# Patient Record
Sex: MALE | Race: WHITE | NOT HISPANIC OR LATINO | Employment: OTHER | URBAN - METROPOLITAN AREA
[De-identification: names, ages, dates, MRNs, and addresses within clinical notes are randomized per-mention and may not be internally consistent; named-entity substitution may affect disease eponyms.]

---

## 2023-07-24 ENCOUNTER — HOSPITAL ENCOUNTER (OUTPATIENT)
Dept: DATA CONVERSION | Facility: HOSPITAL | Age: 48
End: 2023-07-24
Attending: PHYSICAL MEDICINE & REHABILITATION | Admitting: PHYSICAL MEDICINE & REHABILITATION
Payer: COMMERCIAL

## 2023-07-24 DIAGNOSIS — M54.16 RADICULOPATHY, LUMBAR REGION: ICD-10-CM

## 2023-07-24 DIAGNOSIS — M51.26 OTHER INTERVERTEBRAL DISC DISPLACEMENT, LUMBAR REGION: ICD-10-CM

## 2023-09-29 VITALS — BODY MASS INDEX: 58.99 KG/M2 | HEIGHT: 60 IN | WEIGHT: 300.49 LBS

## 2023-09-30 NOTE — H&P
History & Physical Reviewed:   I have reviewed the History and Physical dated:  24-Jul-2023   History and Physical reviewed and relevant findings noted. Patient examined to review pertinent physical  findings.: No significant changes   Home Medications Reviewed: no changes noted   Allergies Reviewed: no changes noted       ERAS (Enhanced Recovery After Surgery):  ·  ERAS Patient: no     Consent:   COVID-19 Consent:  ·  COVID-19 Risk Consent Surgeon has reviewed key risks related to the risk of yulissa COVID-19 and if they contract COVID-19 what the risks are.       Electronic Signatures:  Selvin Goncalves)  (Signed 24-Jul-2023 07:33)   Authored: History & Physical Reviewed, ERAS, Consent,  Note Completion      Last Updated: 24-Jul-2023 07:33 by Selvin Goncalves)

## 2024-01-23 ENCOUNTER — APPOINTMENT (OUTPATIENT)
Dept: PAIN MEDICINE | Facility: CLINIC | Age: 49
End: 2024-01-23
Payer: COMMERCIAL

## 2024-02-13 ENCOUNTER — OFFICE VISIT (OUTPATIENT)
Dept: PAIN MEDICINE | Facility: CLINIC | Age: 49
End: 2024-02-13
Payer: COMMERCIAL

## 2024-02-13 DIAGNOSIS — M51.26 HERNIATED NUCLEUS PULPOSUS, L2-3: ICD-10-CM

## 2024-02-13 DIAGNOSIS — M51.26 HERNIATED NUCLEUS PULPOSUS, L3-4: Primary | ICD-10-CM

## 2024-02-13 DIAGNOSIS — M54.16 BILATERAL LUMBAR RADICULOPATHY: ICD-10-CM

## 2024-02-13 PROCEDURE — 99214 OFFICE O/P EST MOD 30 MIN: CPT | Performed by: PHYSICAL MEDICINE & REHABILITATION

## 2024-02-13 NOTE — PROGRESS NOTES
Chief complaint  Back and lower limbs     History  Mr Miner is back for visit  WILMA in July lasted until now  Pain in the back and lower limbs  The pain in the back is deep achy worse in the mid back area.  This is associated with tight muscle bands.  This limits the range of motion of the lumbar spine mainly in forward flexion.  The pain in the back is radiating around the side on the lateral aspect of the   thighs going down toward the lateral aspect of the legs into the lateral malleosli toward the lateral aspect of the feet towards the big toes.    This pain down to the lower limbs is more of a burning tingling sensation.  The pain in the   lower limbs worsens with bending forward or with any lifting, it improves with laying on the side and resting.  This is similar to the associated pain in the middle to lower back.  Denied any bowel or bladder incontinence.  With the worst pain there is tendency to catch the toe especially on carpeted area.  This occurs mostly when tired and toward the end of the day.     Usually the Wilma control pain and allow him to function            The pain control  are helping control the pain and improving Activities of Daily living and quality of life and quality of sleep.       Denied any fever or chills. No weight loss and no night sweats. No cough or sputum production. No diarrhea   The constipation has been responding to fibers and over the counter medications.     No bladder and bowel incontinence and no other changes in bladder and bowel. No skin changes.  Reports tiredness and fatigability only if the pain is not controlled.   Denied opioids diversion and abuse and denies alcoholism. Denies overuse of the pain medications.     Physical examination  Awake, alert and oriented for time place and persons   declined Chaperone for the visit and was adequately  draped for the exam.  There is decreased sensory to light touch on the lateral aspects of the thighs and around the   knee  going down to the lateral aspect of the legs to the   lateral malleoli into the dorsum of the feet..    Deep tendon reflexes is present for the patellar tendons bilaterally.  Achilles reflexes are present bilaterally and symmetric.    Medial Hamstrings reflex is decreased bilaterally  Plantar cutaneous are downgoing.  Ankle dorsiflexion is 5/5 bilaterally.  Plantar flexion of the ankles are 5/5 bilaterally.  Big toe extension is 4/5 bilaterally  Negative Tinel's sign over the right peroneal nerve at the fibular neck.  Herbert sign for axial loading and global rotation are negative.  No aberrant pain behavior.     Diagnosis  Problem List Items Addressed This Visit       Herniated nucleus pulposus, L2-3 - Primary    Relevant Orders    Transforaminal    Bilateral lumbar radiculopathy    Relevant Orders    Transforaminal        Plan  Reviewed the pain generators.  Went over the types of pain with neuropathic and nociceptive and different pathologies and therapeutic modalities. Discussed the mechanism of action of interventions from acupuncture, physical therapy , regular exercises, injections, botox, spinal cord stimulation, and role of surgery     Went over pathology of the intervertebral disc displacement and the anatomical relation to the Nerve roots and relation to the radicular symptoms. Went over treatment modalities with conservative treatment including acupuncture   and epidural steroid injection with fluoroscopy guidance and last resort of surgery      Continue with HEP  Consider RAY bilateral L5 Transforaminal epidural steroid injection      Risks not limited to infection, sepsis, abscess, bleeding , nerve injury, paralysis, and death...   Manuel PA     Discussed about NSAIDS and I explained about the opioids sparing effect to allow keeping the opioids dose at minimal effective dose.   I went over the potential side effects of the NSAIDS on the gastrointestinal, renal and cardiovascular systems.      I  detailed the side effects from the acetaminophen in the medication and made aware of those. I also explained about the cumulative effects on the organs and mainly the liver.        Follow-up after above or earlier if needed     The level of clinical decision making in this office visit,  is high, given the high risks of complications with the morbidity and mortality due to the fact that acute and chronic pain may pose a threat to life and bodily function, if under treated, poorly treated, or with failure to maintain adequate treatment and timely medical follow up. Additionally over treatment has its own set of complications including overdosing on the pain medications and also the habit forming potentials with the use of the medications used to treat chronic painful conditions including therapeutic classes classified as dangerous medications. Given the serious and fluctuating nature of pain level and instensity with extensive consideration for whenever pain changes, there is always the risk of prolonged functional impairment requiring close patient monitoring with regular assessments and reassessments and high level medical decision making at every office visit. The amount and complexity of data reviewed is high given the patient clinical presentation, labs,  data, radiology reports, and other tests as discussed during office visits. Pertinent data whether positive or negative were taken in consideration in the process of making this high level medical decision.

## 2024-03-18 ENCOUNTER — HOSPITAL ENCOUNTER (OUTPATIENT)
Dept: RADIOLOGY | Facility: CLINIC | Age: 49
Discharge: HOME | End: 2024-03-18
Payer: COMMERCIAL

## 2024-03-18 ENCOUNTER — HOSPITAL ENCOUNTER (OUTPATIENT)
Dept: OPERATING ROOM | Facility: CLINIC | Age: 49
Setting detail: OUTPATIENT SURGERY
Discharge: HOME | End: 2024-03-18
Attending: PHYSICAL MEDICINE & REHABILITATION | Admitting: PHYSICAL MEDICINE & REHABILITATION
Payer: COMMERCIAL

## 2024-03-18 DIAGNOSIS — M54.16 BILATERAL LUMBAR RADICULOPATHY: ICD-10-CM

## 2024-03-18 DIAGNOSIS — M51.26 HERNIATED NUCLEUS PULPOSUS, L3-4: ICD-10-CM

## 2024-03-18 DIAGNOSIS — M51.26 HERNIATED NUCLEUS PULPOSUS, L2-3: ICD-10-CM

## 2024-03-18 PROCEDURE — 64483 NJX AA&/STRD TFRM EPI L/S 1: CPT | Performed by: PHYSICAL MEDICINE & REHABILITATION

## 2024-03-18 RX ORDER — PRIMIDONE 50 MG/1
50 TABLET ORAL NIGHTLY
COMMUNITY
Start: 2015-10-12

## 2024-03-18 RX ORDER — SERTRALINE HYDROCHLORIDE 50 MG/1
50 TABLET, FILM COATED ORAL DAILY
COMMUNITY
End: 2024-03-18 | Stop reason: SDUPTHER

## 2024-03-18 RX ORDER — ZOLPIDEM TARTRATE 10 MG/1
10 TABLET ORAL NIGHTLY PRN
COMMUNITY
Start: 2014-10-20

## 2024-03-18 RX ORDER — TRAZODONE HYDROCHLORIDE 100 MG/1
1 TABLET ORAL NIGHTLY
COMMUNITY
Start: 2018-08-16 | End: 2024-10-26

## 2024-03-18 RX ORDER — BACLOFEN 20 MG/1
20 TABLET ORAL 3 TIMES DAILY
COMMUNITY

## 2024-03-18 RX ORDER — SERTRALINE HYDROCHLORIDE 100 MG/1
100 TABLET, FILM COATED ORAL DAILY
COMMUNITY
Start: 2023-10-27 | End: 2024-10-26

## 2024-03-18 ASSESSMENT — COLUMBIA-SUICIDE SEVERITY RATING SCALE - C-SSRS
6. HAVE YOU EVER DONE ANYTHING, STARTED TO DO ANYTHING, OR PREPARED TO DO ANYTHING TO END YOUR LIFE?: NO
1. IN THE PAST MONTH, HAVE YOU WISHED YOU WERE DEAD OR WISHED YOU COULD GO TO SLEEP AND NOT WAKE UP?: NO
2. HAVE YOU ACTUALLY HAD ANY THOUGHTS OF KILLING YOURSELF?: NO

## 2024-03-18 ASSESSMENT — PAIN SCALES - GENERAL
PAINLEVEL_OUTOF10: 5 - MODERATE PAIN
PAINLEVEL_OUTOF10: 5 - MODERATE PAIN

## 2024-03-18 ASSESSMENT — PAIN - FUNCTIONAL ASSESSMENT
PAIN_FUNCTIONAL_ASSESSMENT: 0-10
PAIN_FUNCTIONAL_ASSESSMENT: 0-10

## 2024-03-18 NOTE — OP NOTE
Operative Note     Date: 3/18/2024  OR Location: [facility] [place] [facility] NON-OR PROCEDURES    Name: Iban Miner, : 1975, Age: 48 y.o., MRN: [id number], Sex: male    Diagnosis  Lumbar radic bilateral L4 ,   Lumbar radic bilateral L4 ,       Procedures  Bilateral L4 Transforaminal epidural steroid injection        Surgeons   Selvin Goncalves MD     Resident/Fellow/Other Assistant:  * No surgeons found in log *    Procedure Summary  Anesthesia: * No anesthesia type entered *  ASA: ASA status not filed in the log.  Anesthesia Staff: No anesthesia staff entered.  Estimated Blood Loss:  0 mL  Intra-op Medications: * Intraprocedure medication information is unavailable because the case start and end events have not been set *      Intraprocedure I/O Totals       None           Specimen: No specimens collected     Staff:   No surgical staff documented.         Drains and/or Catheters: * None in log *    Tourniquet Times:         Implants:     Findings: End plates changes     Indications: Iban Miner is an 48 y.o. male who is having Bilateral L4 Transforaminal epidural steroid injection   The patient was seen in the preoperative area. The risks, benefits, complications, treatment options, non-operative alternatives, expected recovery and outcomes were discussed with the patient. The possibilities of reaction to medication, pulmonary aspiration, injury to surrounding structures, bleeding, recurrent infection, the need for additional procedures, failure to diagnose a condition, and creating a complication requiring transfusion or operation were discussed with the patient. The patient concurred with the proposed plan, giving informed consent.  The site of surgery was properly noted/marked if necessary per policy. The patient has been actively warmed in preoperative area. Preoperative antibiotics are not indicated. Venous thrombosis prophylaxis are not indicated.    Procedure Details:  Time-in:  1042 am    Time-out:  1055 am   Sedation:  none  Indications: Failure to respond to conservative treatment with therapy and medications.    PROCEDURE:    The risks, benefits and alternatives of the procedure were discussed with the patient and agreed to proceed. The risks included but not limited to: infection, bleeding, paralysis, nerve injury sepsis and remotely death were discussed with the patient during the office and again in the pre procedure area.  The patient signed informed consent in the pre procedure area.     The patient was brought to procedure room and time out for the procedure was performed with the procedure room staff present. Patient placed in prone position on the procedure table and draped and covered appropriately.      Fluoroscopy machine was used to identify the right L4 neuroforamen    Skin prepped and draped in sterile fashion over the lower lumbar spine area.  Skin was infiltrated with local anesthetic with 0.5% lidocaine.  Spinal needle was introduced to the neuroforamen, verified with fluoroscopy.  Adequate flow of contrast dye around the nerve root was verified with fluoroscopy.  At that point, 40 mg of kenalog mixed with 5 mL of normal saline were injected.      felt the tingling down the lower limb during the injection     Same procedure repeated on the left L4 neuroforamen with 40 mg of kenalog  mixed with 5 cc of normal saline solution  injected around the left L4 nerve root.     felt the tingling down the lower limb during the injection     Procedure tolerated very well.  No complications encountered.  Post procedure care discussed with the patient, agreed to proceed.   Patient instructed on keeping track of the pain level post discharge.   Patient discharged home, from the recovery room, in stable condition.   Complications:  None; patient tolerated the procedure well.    Disposition: PACU - hemodynamically stable.  Condition: stable         Additional Details: as above     Attending  Attestation: I performed the procedure.    Selvin Goncalves MD

## 2024-03-18 NOTE — H&P
Chief complaint  Back and lower limbs      History  Mr Miner is back for visit  WILMA in July lasted until now  Pain in the back and lower limbs  The pain in the back is deep achy worse in the mid back area.  This is associated with tight muscle bands.  This limits the range of motion of the lumbar spine mainly in forward flexion.  The pain in the back is radiating around the side on the lateral aspect of the   thighs going down toward the lateral aspect of the legs into the lateral malleosli toward the lateral aspect of the feet towards the big toes.    This pain down to the lower limbs is more of a burning tingling sensation.  The pain in the   lower limbs worsens with bending forward or with any lifting, it improves with laying on the side and resting.  This is similar to the associated pain in the middle to lower back.  Denied any bowel or bladder incontinence.  With the worst pain there is tendency to catch the toe especially on carpeted area.  This occurs mostly when tired and toward the end of the day.      Usually the Wilma control pain and allow him to function               The pain control  are helping control the pain and improving Activities of Daily living and quality of life and quality of sleep.        Denied any fever or chills. No weight loss and no night sweats. No cough or sputum production. No diarrhea   The constipation has been responding to fibers and over the counter medications.      No bladder and bowel incontinence and no other changes in bladder and bowel. No skin changes.  Reports tiredness and fatigability only if the pain is not controlled.   Denied opioids diversion and abuse and denies alcoholism. Denies overuse of the pain medications.     Physical examination  Awake, alert and oriented for time place and persons   declined Chaperone for the visit and was adequately  draped for the exam.  There is decreased sensory to light touch on the lateral aspects of the thighs and around the    knee going down to the lateral aspect of the legs to the   lateral malleoli into the dorsum of the feet..    Deep tendon reflexes is present for the patellar tendons bilaterally.  Achilles reflexes are present bilaterally and symmetric.    Medial Hamstrings reflex is decreased bilaterally  Plantar cutaneous are downgoing.  Ankle dorsiflexion is 5/5 bilaterally.  Plantar flexion of the ankles are 5/5 bilaterally.  Big toe extension is 4/5 bilaterally  Negative Tinel's sign over the right peroneal nerve at the fibular neck.  Herbert sign for axial loading and global rotation are negative.  No aberrant pain behavior.      Diagnosis  Problem List Items Addressed This Visit         Herniated nucleus pulposus, L2-3 - Primary     Relevant Orders     Transforaminal     Bilateral lumbar radiculopathy     Relevant Orders     Transforaminal         Plan  Reviewed the pain generators.  Went over the types of pain with neuropathic and nociceptive and different pathologies and therapeutic modalities. Discussed the mechanism of action of interventions from acupuncture, physical therapy , regular exercises, injections, botox, spinal cord stimulation, and role of surgery      Went over pathology of the intervertebral disc displacement and the anatomical relation to the Nerve roots and relation to the radicular symptoms. Went over treatment modalities with conservative treatment including acupuncture   and epidural steroid injection with fluoroscopy guidance and last resort of surgery      Continue with HEP  Consider RAY bilateral L5 Transforaminal epidural steroid injection      Risks not limited to infection, sepsis, abscess, bleeding , nerve injury, paralysis, and death...   Manuel ESCALONA      Discussed about NSAIDS and I explained about the opioids sparing effect to allow keeping the opioids dose at minimal effective dose.   I went over the potential side effects of the NSAIDS on the gastrointestinal, renal and cardiovascular  systems.       I detailed the side effects from the acetaminophen in the medication and made aware of those. I also explained about the cumulative effects on the organs and mainly the liver.         Follow-up after above or earlier if needed      The level of clinical decision making in this office visit,  is high, given the high risks of complications with the morbidity and mortality due to the fact that acute and chronic pain may pose a threat to life and bodily function, if under treated, poorly treated, or with failure to maintain adequate treatment and timely medical follow up. Additionally over treatment has its own set of complications including overdosing on the pain medications and also the habit forming potentials with the use of the medications used to treat chronic painful conditions including therapeutic classes classified as dangerous medications. Given the serious and fluctuating nature of pain level and instensity with extensive consideration for whenever pain changes, there is always the risk of prolonged functional impairment requiring close patient monitoring with regular assessments and reassessments and high level medical decision making at every office visit. The amount and complexity of data reviewed is high given the patient clinical presentation, labs,  data, radiology reports, and other tests as discussed during office visits. Pertinent data whether positive or negative were taken in consideration in the process of making this high level medical decision.

## 2024-03-21 VITALS
TEMPERATURE: 96.8 F | RESPIRATION RATE: 17 BRPM | HEART RATE: 84 BPM | HEIGHT: 72 IN | WEIGHT: 306.44 LBS | OXYGEN SATURATION: 98 % | SYSTOLIC BLOOD PRESSURE: 123 MMHG | DIASTOLIC BLOOD PRESSURE: 73 MMHG | BODY MASS INDEX: 41.51 KG/M2

## 2024-07-09 DIAGNOSIS — M51.26 HERNIATED NUCLEUS PULPOSUS, L2-3: ICD-10-CM

## 2024-07-09 DIAGNOSIS — M54.16 BILATERAL LUMBAR RADICULOPATHY: Primary | ICD-10-CM

## 2024-10-08 ENCOUNTER — APPOINTMENT (OUTPATIENT)
Dept: PAIN MEDICINE | Facility: CLINIC | Age: 49
End: 2024-10-08
Payer: COMMERCIAL